# Patient Record
Sex: FEMALE | Race: WHITE
[De-identification: names, ages, dates, MRNs, and addresses within clinical notes are randomized per-mention and may not be internally consistent; named-entity substitution may affect disease eponyms.]

---

## 2020-03-02 ENCOUNTER — HOSPITAL ENCOUNTER (EMERGENCY)
Dept: HOSPITAL 59 - ER | Age: 55
Discharge: HOME | End: 2020-03-02
Payer: COMMERCIAL

## 2020-03-02 DIAGNOSIS — Z98.84: ICD-10-CM

## 2020-03-02 DIAGNOSIS — R10.13: Primary | ICD-10-CM

## 2020-03-02 DIAGNOSIS — I10: ICD-10-CM

## 2020-03-02 DIAGNOSIS — R74.8: ICD-10-CM

## 2020-03-02 LAB
ABSOLUTE NEUTROPHIL COUNT: 4.66
ALBUMIN SERPL-MCNC: 4.2 G/DL (ref 4–5)
ALP SERPL-CCNC: 81 U/L (ref 35–104)
ALT SERPL-CCNC: 23 U/L (ref ?–33)
ANION GAP SERPL CALC-SCNC: 13 MMOL/L (ref 7–16)
APPEARANCE UR: (no result)
AST SERPL-CCNC: 20 U/L (ref 10–35)
BACTERIA #/AREA URNS HPF: (no result) /[HPF]
BASOPHILS NFR BLD: 0.3 % (ref 0–6)
BILIRUB DIRECT SERPL-MCNC: < 0.2 MG/DL (ref 0–0.3)
BILIRUB SERPL-MCNC: 0.3 MG/DL (ref 0.2–1)
BILIRUB UR-MCNC: NEGATIVE MG/DL
BUN SERPL-MCNC: 13 MG/DL (ref 6–20)
CO2 SERPL-SCNC: 23 MMOL/L (ref 22–29)
COLOR UR: YELLOW
CREAT SERPL-MCNC: 0.8 MG/DL (ref 0.5–0.9)
EOSINOPHIL NFR BLD: 2 % (ref 0–6)
EPI CELLS #/AREA URNS HPF: >50 /[HPF]
ERYTHROCYTE [DISTWIDTH] IN BLOOD BY AUTOMATED COUNT: 13.1 % (ref 11.5–14.5)
EST GLOMERULAR FILTRATION RATE: > 60 ML/MIN
GLUCOSE SERPL-MCNC: 131 MG/DL (ref 74–109)
GLUCOSE UR STRIP-MCNC: NEGATIVE MG/DL
GRANULOCYTES NFR BLD: 62 % (ref 47–80)
HCT VFR BLD CALC: 43.1 % (ref 35–47)
HGB BLD-MCNC: 14.4 GM/DL (ref 11.6–16)
KETONES UR QL STRIP: NEGATIVE
LIPASE SERPL-CCNC: 109 U/L (ref 13–60)
LYMPHOCYTES NFR BLD AUTO: 25.2 % (ref 16–45)
MCH RBC QN AUTO: 29.8 PG (ref 27–33)
MCHC RBC AUTO-ENTMCNC: 33.4 G/DL (ref 32–36)
MCV RBC AUTO: 89 FL (ref 81–97)
MONOCYTES NFR BLD: 10.5 % (ref 0–9)
NITRITE UR QL STRIP: NEGATIVE
PLATELET # BLD: 355 K/UL (ref 130–400)
PMV BLD AUTO: 10.2 FL (ref 7.4–10.4)
PROT SERPL-MCNC: 7.7 G/DL (ref 6.6–8.7)
PROT UR QL STRIP: NEGATIVE
RBC # BLD AUTO: 4.84 M/UL (ref 3.8–5.4)
RBC # UR STRIP: NEGATIVE /UL
RBC #/AREA URNS HPF: (no result) /[HPF]
URINE LEUKOCYTE ESTERASE: (no result)
UROBILINOGEN UR STRIP-ACNC: 0.2 E.U./DL (ref 0.2–1)
WBC # BLD AUTO: 7.5 K/UL (ref 4.2–12.2)
WBC #/AREA URNS HPF: (no result) /[HPF]

## 2020-03-02 PROCEDURE — 96375 TX/PRO/DX INJ NEW DRUG ADDON: CPT

## 2020-03-02 PROCEDURE — 74177 CT ABD & PELVIS W/CONTRAST: CPT

## 2020-03-02 PROCEDURE — 83690 ASSAY OF LIPASE: CPT

## 2020-03-02 PROCEDURE — 99284 EMERGENCY DEPT VISIT MOD MDM: CPT

## 2020-03-02 PROCEDURE — 80048 BASIC METABOLIC PNL TOTAL CA: CPT

## 2020-03-02 PROCEDURE — 81001 URINALYSIS AUTO W/SCOPE: CPT

## 2020-03-02 PROCEDURE — 96365 THER/PROPH/DIAG IV INF INIT: CPT

## 2020-03-02 PROCEDURE — 85025 COMPLETE CBC W/AUTO DIFF WBC: CPT

## 2020-03-02 PROCEDURE — 80076 HEPATIC FUNCTION PANEL: CPT

## 2020-03-02 NOTE — CT SCAN REPORT
EXAMINATION:  CT Abdomen and Pelvis with IV Contrast

EXAM DATE: 3/2/2020 2:31 PM



TECHNIQUE: CT imaging of the abdomen and pelvis was performed with intravenous contrast. Coronal and 
sagittal images were reconstructed.

IV Contrast: The amount and type of contrast are recorded in the medical record. 



INDICATION: upper AP

COMPARISON: None



ENCOUNTER: Not applicable

____________________



CT ABDOMEN AND PELVIS FINDINGS:    



Lung Bases: Discoid atelectasis or scarring right base.  



Hepatobiliary: The liver has a normal size with a smooth surface. The hepatic and portal veins appear
 patent.  Cholecystectomy. Pneumobilia.



Pancreas: The pancreas is normal.



Spleen: The spleen is not enlarged.



Adrenals: The adrenal glands are normal.



Kidneys, Ureters, & Bladder: Both kidneys have a normal size and there is no hydronephrosis.  Both ur
eters have a normal caliber and the urinary bladder is unremarkable.



Gastrointestinal: Gastric bypass. Wall thickening distal esophagus. Appendix not demonstrated. Probab
le nondistention descending and rectosigmoid colon.  Sigmoid diverticulosis. No diverticulitis 



Reproductive Organs: Unremarkable



Lymphatic System: There is no adenopathy within the abdomen or pelvis.



Vasculature: Normal caliber abdominal aorta.



Peritoneum: No free fluid, free air, or inflammation 



Abdominal Wall & Musculoskeletal: No suspicious bone lesions. Small fat-containing umbilical hernia

____________________



IMPRESSION:



1.  Discoid atelectasis or scarring right base

2.  Cholecystectomy

3.  Pneumobilia

4.  Gastric bypass

5.  Wall thickening distal esophagus

6.  Probable nondistention descending to rectosigmoid colon. Sigmoid diverticulosis.

7.  Small fat-containing umbilical hernia







Dictated by: Kulwinder Laird MD on 3/2/2020 2:48 PM.

Electronically signed by: Kulwinder Laird MD on 3/2/2020 3:00 PM.

## 2020-03-02 NOTE — EMERGENCY DEPARTMENT RECORD
History of Present Illness





- General


Chief Complaint: Abdominal Pain


Stated Complaint: ABD PAIN


Time Seen by Provider: 20 11:48


Source: Patient


Mode of Arrival: Ambulatory


Limitations: No limitations





- History of Present Illness


Initial Comments: 


The patient is here due to upper abdominal pain for just over 24 hours. The pain

is sharp and stabbing and is located in the epigastric area and radiates to the 

back. She has no nausea or vomiting with it and also denies any lower abdominal 

or pelvic pain. The patient states she has had this same pain last year when she

had a stone stuck in her bile duct which caused jaundice. The patient at that 

time was seen at [facility] and transferred to [facility] for treatment. She also 

states she has had her GB removed a few years ago at [facility] and Gastric 

Bypass many years ago there also.





MD Complaint: Abdominal pain


Onset/Timin


Radiation: Epigastric


Severity: Moderate


Severity scale (1-10): 6


Quality: Sharp


Improves With: Nothing


Worsens With: Nothing


Associated Symptoms: Denies other symptoms





- Related Data


                                Home Medications











 Medication  Instructions  Recorded  Confirmed  Last Taken


 


Lisinopril/Hydrochlorothiazide 1 tab PO DAILY 20





[Lisinopril-Hctz 20-12.5 mg Tab]    








                                  Previous Rx's











 Medication  Instructions  Recorded


 


Sucralfate [Carafate] 1 gm PO QID #28 tablet 20











                                    Allergies











Allergy/AdvReac Type Severity Reaction Status Date / Time


 


No Known Drug Allergies Allergy   Verified 20 11:49














Travel/Exposure Screening





- Travel/Exposure Within Last 30 Days


Have you traveled within the last 30 days?: No





- Additonal Travel/Exposure Details


Have you been exposed to anyone with a communicable illness?: No





Review of Systems


Constitutional: Denies: Chills, Fever


Eyes: Denies: Eye discharge


ENT: Denies: Congestion


Respiratory: Denies: Cough, Dyspnea


Cardiovascular: Denies: Chest pain


Endocrine: Denies: Fatigue


Gastrointestinal: Reports: Abdominal pain.  Denies: Diarrhea, Nausea, Vomiting


Genitourinary: Denies: Dysuria


Musculoskeletal: Denies: Arthralgia





Past Medical History





- SOCIAL HISTORY


Smoking Status: Never smoker


Alcohol Use: None


Drug Use: None





- RESPIRATORY


Hx Respiratory Disorders: No





- CARDIOVASCULAR


Hx Cardio Disorders: Yes


Hx Edema: Yes


Hx Hypertension: Yes





- NEURO


Hx Neuro Disorders: No





- GI


Hx GI Disorders: Yes


Hx Abdominal Pain: Yes


Hx Hepatitis/Jaundice: Yes





- 


Hx Genitourinary Disorders: No





- ENDOCRINE


Hx Endocrine Disorders: No





- MUSCULOSKELETAL


Hx Musculoskeletal Disorders: No





- PSYCH


Hx Psych Problems: No





- HEMATOLOGY/ONCOLOGY


Hx Hematology/Oncology Disorders: No





Family Medical History


Any Significant Family History?: No





Physical Exam





- General


General Appearance: Alert, Oriented x3, Cooperative, No acute distress





- Head


Head exam: Atraumatic, Normocephalic





- Eye


Eye exam: Normal appearance





- ENT


Throat exam: Normal inspection.  negative: Tonsillar erythema, Tonsillar exudate





- Neck


Neck exam: Normal inspection, Full ROM.  negative: Tenderness





- Respiratory


Respiratory exam: Normal lung sounds bilaterally.  negative: Respiratory 

distress





- Cardiovascular


Cardiovascular Exam: Regular rate, Normal rhythm, Normal heart sounds





- GI/Abdominal


GI/Abdominal exam: Soft, Tenderness (There is mild epigastric tenderness to 

palpation.).  negative: Guarding, Rebound, Rigid





- Extremities


Extremities exam: Normal inspection, Full ROM, Normal capillary refill.  

negative: Tenderness





- Neurological


Neurological exam: Alert.  negative: Motor sensory deficit





Course





                                   Vital Signs











  20





  11:45


 


Temperature 98.0 F


 


Pulse Rate 74


 


Respiratory 20





Rate 


 


Blood Pressure 147/97


 


Pulse Ox 99














- Reevaluation(s)


Reevaluation #1: 


The patient is doing a lot better at this time. She states the AP now is 

resolved. On exam her abdomen is soft and nontender in all 4 quads.


20 13:19





Reevaluation #2: 


The patient is doing better at this time. Her pain is almost completely gone. I 

did discuss the fact other than a mildly elevated Lipase I cannot find anything 

else to attribute the  pain to. The patient does have pneumobilia on CT but she 

has had that in the past and her records indicate she has had a sphincterotomy. 

I strongly doubt any infectious etiology due to the fact the patient has no 

fever, elevated WBC, and normal liver enzymes. The patient does have an 

appointment at 8am with her doctor. I did recommend recheck in the ER but she is

 planning on starting there first and going from there.


20 16:00








Medical Decision Making





- Data Complexity


MDM Data: Labs Ordered and/or Reviewed, X-Ray Ordered and/or Reviewed





- Lab Data


Result diagrams: 


                                 20 12:00





                                 20 12:00





- Radiology Data


Radiology results: Report reviewed (Abd CT: Neg except for Pneumobilia (The 

patient has had a sphincterotomy and has had it in the past on prior CT's))





Disposition


Disposition: Discharge


Clinical Impression: 


Abdominal pain


Qualifiers:


 Abdominal location: unspecified location Qualified Code(s): R10.9 - Unspecified

 abdominal pain





Disposition: Home, Self-Care


Condition: (2) Stable


Instructions:  Abdominal Pain (ED)


Additional Instructions: 


Please eat a very bland diet and take the Carafate as directed. Please see your 

doctor in the morning to be rechecked and to have your Lipase redrawn. Return to

 the ER for any worsening symptoms, pain, fever, or vomiting.


Prescriptions: 


Sucralfate [Carafate] 1 gm PO QID #28 tablet


Forms:  Patient Portal Access


Time of Disposition: 16:05





Quality





- Quality Measures


Quality Measures: N/A





- Blood Pressure Screening


View Details: Yes


Does Patient Have Any of the Following: No


Blood Pressure Classification: Hypertensive Reading


Systolic Measurement: 147


Diastolic Measurement: 97


Screening for High Blood Pressure: < First Hypertensive BP, F/U Documented > 

[]


First Hypertensive Follow-up Interventions: Referral to alternative/primary care

 provider.